# Patient Record
Sex: FEMALE | Race: WHITE | NOT HISPANIC OR LATINO | Employment: OTHER | URBAN - METROPOLITAN AREA
[De-identification: names, ages, dates, MRNs, and addresses within clinical notes are randomized per-mention and may not be internally consistent; named-entity substitution may affect disease eponyms.]

---

## 2017-01-19 ENCOUNTER — CONVERSION ENCOUNTER (OUTPATIENT)
Dept: OTHER | Facility: HOSPITAL | Age: 67
End: 2017-01-19

## 2017-02-06 ENCOUNTER — HOSPITAL ENCOUNTER (OUTPATIENT)
Dept: MRI IMAGING | Facility: HOSPITAL | Age: 67
Discharge: HOME | End: 2017-02-06
Attending: PSYCHIATRY & NEUROLOGY | Admitting: PSYCHIATRY & NEUROLOGY

## 2017-02-06 LAB — CREAT BLDA-MCNC: 1 MG/DL (ref 0.6–1.3)

## 2018-04-02 ENCOUNTER — CONVERSION ENCOUNTER (OUTPATIENT)
Dept: OTHER | Facility: HOSPITAL | Age: 68
End: 2018-04-02

## 2019-04-09 ENCOUNTER — CONVERSION ENCOUNTER (OUTPATIENT)
Dept: OTHER | Facility: HOSPITAL | Age: 69
End: 2019-04-09

## 2019-06-04 VITALS
DIASTOLIC BLOOD PRESSURE: 67 MMHG | DIASTOLIC BLOOD PRESSURE: 74 MMHG | HEIGHT: 65 IN | HEIGHT: 65 IN | SYSTOLIC BLOOD PRESSURE: 143 MMHG | DIASTOLIC BLOOD PRESSURE: 74 MMHG | BODY MASS INDEX: 19.99 KG/M2 | WEIGHT: 120 LBS | HEART RATE: 63 BPM | BODY MASS INDEX: 19.52 KG/M2 | OXYGEN SATURATION: 98 % | WEIGHT: 117.13 LBS | HEART RATE: 67 BPM | SYSTOLIC BLOOD PRESSURE: 131 MMHG | SYSTOLIC BLOOD PRESSURE: 131 MMHG | HEART RATE: 63 BPM | WEIGHT: 120 LBS

## 2019-11-18 ENCOUNTER — TELEPHONE (OUTPATIENT)
Dept: NEUROLOGY | Facility: CLINIC | Age: 69
End: 2019-11-18

## 2019-12-04 NOTE — TELEPHONE ENCOUNTER
She had MRI brain which was normal   So no reason to tell the insurance company that an mri is indicated, panic attacks do not qualify.    Probably needs to see behavioral health for management of anxiety and panic attacks. I can put in referral if she is interested

## 2019-12-06 ENCOUNTER — TELEPHONE (OUTPATIENT)
Dept: NEUROLOGY | Facility: CLINIC | Age: 69
End: 2019-12-06

## 2019-12-06 NOTE — TELEPHONE ENCOUNTER
FYI- patient wanted you to know the new medication is working and does not want to have MRI brain.

## 2020-03-03 DIAGNOSIS — R56.9 GENERALIZED CONVULSIVE SEIZURES (HCC): Primary | ICD-10-CM

## 2020-03-17 DIAGNOSIS — R56.9 GENERALIZED CONVULSIVE SEIZURES (HCC): ICD-10-CM

## 2020-04-20 ENCOUNTER — TELEPHONE (OUTPATIENT)
Dept: NEUROLOGY | Facility: CLINIC | Age: 70
End: 2020-04-20

## 2020-04-20 PROBLEM — I25.10 CORONARY ARTERY DISEASE: Status: ACTIVE | Noted: 2020-04-20

## 2020-04-20 PROBLEM — E78.5 HYPERLIPIDEMIA: Status: ACTIVE | Noted: 2020-04-20

## 2020-04-20 PROBLEM — I10 HYPERTENSION: Status: ACTIVE | Noted: 2020-04-20

## 2020-04-20 PROBLEM — Z82.0 FAMILY HISTORY OF EPILEPSY AND OTHER DISEASES OF THE NERVOUS SYSTEM: Status: ACTIVE | Noted: 2020-04-20

## 2020-04-20 PROBLEM — G40.909 SEIZURE DISORDER: Status: ACTIVE | Noted: 2020-04-20

## 2020-04-20 RX ORDER — ASPIRIN 81 MG/1
TABLET ORAL
COMMUNITY
Start: 2014-11-10 | End: 2023-06-13

## 2020-04-20 NOTE — TELEPHONE ENCOUNTER
Pt needs to make an appt   Either in office or via video tele-med   Last seen here April one year ago

## 2020-04-20 NOTE — TELEPHONE ENCOUNTER
PT CALLED  IN STATING SHE DOESN'T LIKE THE BRIVIACT  . SHE IS HAVING ALL THE SIDE EFFECTS OF THE MEDICATION SHE IS HAVING BAD STOMACH PAINS. PANIC ATTACKS THEY ARE GETTING WORSE. THE HORSINESS OF VOICE   AND WEAK AND SHAKY AND DIZZY AND FEELS TIRED ALL OF THE TIME  AND SHE DOESN'T FEEL TIRED NORMALLY SHE WOULD HAVE LOTS OF ENERGY .  SHE IS REQUESTING IF SHE COULD BE PUT ON SOMETHING ELSE  SHE REALLY DISLIKES THE BRIVICAT .         BEST CALL BACK NUMBER [place] PHONE .[phone]

## 2020-09-01 DIAGNOSIS — R56.9 GENERALIZED CONVULSIVE SEIZURES (HCC): ICD-10-CM

## 2020-09-01 RX ORDER — BRIVARACETAM 25 MG/1
TABLET, FILM COATED ORAL
Qty: 180 TABLET | Refills: 1 | OUTPATIENT
Start: 2020-09-01

## 2020-09-11 NOTE — TELEPHONE ENCOUNTER
called today to know why she is not able to get this medication. She is out of medication and is in need.

## 2020-09-16 DIAGNOSIS — R56.9 GENERALIZED CONVULSIVE SEIZURES (HCC): ICD-10-CM

## 2020-09-21 RX ORDER — BRIVARACETAM 25 MG/1
25 TABLET, FILM COATED ORAL 2 TIMES DAILY
Qty: 180 TABLET | Refills: 0 | Status: SHIPPED | OUTPATIENT
Start: 2020-09-21 | End: 2020-10-09 | Stop reason: SDUPTHER

## 2020-10-08 PROBLEM — F41.0 PANIC ATTACK AS REACTION TO STRESS: Status: ACTIVE | Noted: 2017-12-05

## 2020-10-08 PROBLEM — F32.A DEPRESSION: Status: ACTIVE | Noted: 2017-12-05

## 2020-10-08 PROBLEM — F43.0 PANIC ATTACK AS REACTION TO STRESS: Status: ACTIVE | Noted: 2017-12-05

## 2020-10-08 RX ORDER — MELOXICAM 15 MG/1
TABLET ORAL DAILY
COMMUNITY
Start: 2020-07-15 | End: 2020-12-18

## 2020-10-08 NOTE — PROGRESS NOTES
Tele health  TELEPHONE  TIME 11MIN 30 SEC  You have chosen to receive care through a telephone visit. Do you consent to use a telephone visit for your medical care today? Yes      Electronically signed by Joseph F. Seipel, MD, 10/09/20, 1:42 PM EDT.    Subjective: seizures    Patient ID: Harman Lui is a 69 y.o. female.      Yearly follow up seizure visit.     NO SEIZURES ON BRIVIACT 25MG BID  She feels this medication causes sleepiness    She is stressed and depressed  Her   of cancer this spring and also her oldest brother   She is ready to take some medication to help and will see her pcp this coming week.    She is off the keppra    The following portions of the patient's history were reviewed and updated as appropriate: allergies, current medications, past family history, past medical history, past social history, past surgical history and problem list.    No family history on file.    No past medical history on file.    Social History     Socioeconomic History   • Marital status:      Spouse name: Not on file   • Number of children: Not on file   • Years of education: Not on file   • Highest education level: Not on file         Current Outpatient Medications:   •  aspirin (Aspir-Low) 81 MG EC tablet, ASPIR-LOW 81 MG TBEC, Disp: , Rfl:   •  Brivaracetam (Briviact) 25 MG tablet, Take 25 mg by mouth 2 (Two) Times a Day., Disp: 180 tablet, Rfl: 0  •  lisinopril (PRINIVIL,ZESTRIL) 2.5 MG tablet, Take 2.5 mg by mouth Daily., Disp: , Rfl:   •  meloxicam (MOBIC) 15 MG tablet, Take  by mouth Daily., Disp: , Rfl:   •  simvastatin (ZOCOR) 20 MG tablet, Take 20 mg by mouth Daily., Disp: , Rfl:     Pt sounded stressed, and depressed but speech was normal      Assessment/Plan:    Diagnoses and all orders for this visit:    Generalized convulsive seizures (CMS/HCC)        Continue low dose briviact    Strongly encouraged to see pcp about the depression and stress    This document has been  electronically signed by Joseph Seipel, MD on October 9, 2020 13:45 EDT

## 2020-10-09 ENCOUNTER — OFFICE VISIT (OUTPATIENT)
Dept: NEUROLOGY | Facility: CLINIC | Age: 70
End: 2020-10-09

## 2020-10-09 DIAGNOSIS — R56.9 GENERALIZED CONVULSIVE SEIZURES (HCC): Primary | ICD-10-CM

## 2020-10-09 DIAGNOSIS — F32.9 REACTIVE DEPRESSION: ICD-10-CM

## 2020-10-09 PROBLEM — E78.5 HYPERLIPIDEMIA: Status: ACTIVE | Noted: 2020-10-09

## 2020-10-09 PROBLEM — I21.9 MYOCARDIAL INFARCTION: Status: ACTIVE | Noted: 2020-10-09

## 2020-10-09 PROBLEM — I10 BENIGN ESSENTIAL HYPERTENSION: Status: ACTIVE | Noted: 2020-10-09

## 2020-10-09 PROBLEM — G57.90: Status: ACTIVE | Noted: 2020-10-09

## 2020-10-09 PROCEDURE — 99442 PR PHYS/QHP TELEPHONE EVALUATION 11-20 MIN: CPT | Performed by: PSYCHIATRY & NEUROLOGY

## 2020-10-09 RX ORDER — SIMVASTATIN 20 MG
20 TABLET ORAL DAILY
COMMUNITY
End: 2020-12-18

## 2020-10-09 RX ORDER — BRIVARACETAM 25 MG/1
25 TABLET, FILM COATED ORAL 2 TIMES DAILY
Qty: 180 TABLET | Refills: 3 | Status: SHIPPED | OUTPATIENT
Start: 2020-10-09 | End: 2021-01-19

## 2020-10-09 RX ORDER — LEVETIRACETAM 750 MG/1
750 TABLET ORAL DAILY
COMMUNITY
End: 2020-10-09 | Stop reason: ALTCHOICE

## 2020-10-09 RX ORDER — LISINOPRIL 2.5 MG/1
2.5 TABLET ORAL DAILY
COMMUNITY
End: 2020-12-18

## 2020-12-18 ENCOUNTER — TELEPHONE (OUTPATIENT)
Dept: NEUROLOGY | Facility: CLINIC | Age: 70
End: 2020-12-18

## 2020-12-18 DIAGNOSIS — R56.9 GENERALIZED CONVULSIVE SEIZURES (HCC): Primary | ICD-10-CM

## 2020-12-18 DIAGNOSIS — R56.9 GENERALIZED CONVULSIVE SEIZURES (HCC): ICD-10-CM

## 2020-12-18 NOTE — TELEPHONE ENCOUNTER
I suggest do an EEG and if that looks ok then consider  stopping the medication, the current dose is very small in any case.    I don't know any thing about taking cbd? Oil for seizures    There is a brand name seizure med for kids with some chemical found in cbd oil

## 2020-12-18 NOTE — TELEPHONE ENCOUNTER
----- Message from Whit Maxwell CMA sent at 12/15/2020  3:26 PM EST -----  Regarding: Prescription Question  Contact: [phone]  Please advise    ----- Message -----  From: Harman Lui  Sent: 12/15/2020  11:34 AM EST  To: [facility]  Subject: Prescription Question                            I need to see if Dr Rosario will give me another medicine  Briviact has too many side affects.I kept detailed desciption  after I took it. I wrote everything down. I have every side affect and it is not getting better. I tried but I am really worried about what it does to my heart. I don't want a controlled substance and besides prescription meds what else is there for people that have medicine side affects. What is that oil they give to kids to stop seizures. I told Dr Rosario I would keep using Briviact but I can not keep it up I feel too bad.I told him I would keep trying for a while but I am done. I just got  3 months supply.I want a low dose. I have not had a seizure in 7 years or longer.I want to try the oil. Harman Lui Thanks

## 2021-01-18 ENCOUNTER — TELEPHONE (OUTPATIENT)
Dept: NEUROLOGY | Facility: CLINIC | Age: 71
End: 2021-01-18

## 2021-01-18 RX ORDER — CITALOPRAM 10 MG/1
10 TABLET ORAL DAILY
COMMUNITY
Start: 2020-10-16 | End: 2022-04-18

## 2021-01-18 NOTE — TELEPHONE ENCOUNTER
----- Message from Amira Yuan sent at 1/18/2021  8:27 AM EST -----  Regarding: FW: Prescription Question  Contact: [phone]  Refills needed for 30 days to [facility], generic meds only due to cost please.    ----- Message -----  From: Harman Lui  Sent: 1/17/2021  10:05 AM EST  To: [facility]  Subject: Prescription Question                            Dr. Seipel, I have [employer] now and they do not have 90 day refills only 30 day. And they charge 110.00 for 30 days which is redicoulous. So I need a generic seizure medication so it will be cheaper. [facility] was 120.00 for 90 days. I will not be going off seizure meds. I am scared to. I told them to cancel the eeg back in December.  Whatever is generic and cheaper. Thank you. Harman Lui

## 2021-01-18 NOTE — TELEPHONE ENCOUNTER
Please call javier and ask what seizure meds she has taken over the years  The briviact is brand name only so we would have to change to a different med if needs something cheaper, could go back on one of the prior meds or could try something different

## 2021-01-19 RX ORDER — LEVETIRACETAM 250 MG/1
250 TABLET ORAL 2 TIMES DAILY
Qty: 180 TABLET | Refills: 3 | Status: SHIPPED | OUTPATIENT
Start: 2021-01-19 | End: 2021-03-29 | Stop reason: SDUPTHER

## 2021-02-16 ENCOUNTER — TELEPHONE (OUTPATIENT)
Dept: NEUROLOGY | Facility: CLINIC | Age: 71
End: 2021-02-16

## 2021-02-16 RX ORDER — DIVALPROEX SODIUM 250 MG/1
250 TABLET, EXTENDED RELEASE ORAL DAILY
Qty: 30 TABLET | Refills: 3 | Status: SHIPPED | OUTPATIENT
Start: 2021-02-16 | End: 2022-04-05

## 2021-02-16 NOTE — TELEPHONE ENCOUNTER
----- Message from Amira Yuan sent at 2/15/2021  7:44 AM EST -----  Regarding: FW: Prescription Question  Contact: [phone]    ----- Message -----  From: Harman Lui  Sent: 2/14/2021   1:58 PM EST  To: [facility]  Subject: Prescription Question                            Dr Rosario, I am having bad side affects on  levetiracetam. Dizzy. hoarse  depression, nervous,restless,grouchy, panic attacks. I need another  medicine.Or whatever you think. Also no appetite stomach problems starting. If you change medicines please use low dose.

## 2021-02-16 NOTE — TELEPHONE ENCOUNTER
Harman has tried  Different medications over  the years    Ask if she has tried Depakote/valporic acid or if she has ever tried zonegran / zonisimide?

## 2021-03-01 PROBLEM — I10 HYPERTENSION: Status: RESOLVED | Noted: 2020-04-20 | Resolved: 2021-03-01

## 2021-03-01 PROBLEM — E78.5 HYPERLIPIDEMIA: Status: RESOLVED | Noted: 2020-04-20 | Resolved: 2021-03-01

## 2021-03-29 RX ORDER — LEVETIRACETAM 250 MG/1
250 TABLET ORAL 2 TIMES DAILY
Qty: 180 TABLET | Refills: 3 | Status: SHIPPED | OUTPATIENT
Start: 2021-03-29 | End: 2022-04-24 | Stop reason: ALTCHOICE

## 2022-03-14 ENCOUNTER — TELEPHONE (OUTPATIENT)
Dept: NEUROLOGY | Facility: CLINIC | Age: 72
End: 2022-03-14

## 2022-03-14 DIAGNOSIS — R56.9 GENERALIZED CONVULSIVE SEIZURES: Primary | ICD-10-CM

## 2022-03-14 NOTE — TELEPHONE ENCOUNTER
I will order an eeg   Will then see her at the prior scheduled April visit to discuss results and what to do about the medicaion

## 2022-03-14 NOTE — TELEPHONE ENCOUNTER
Caller: ENDY    Relationship: SELF    Best call back number: [phone]    What medications are you currently taking:   Current Outpatient Medications on File Prior to Visit   Medication Sig Dispense Refill   • aspirin (Aspir-Low) 81 MG EC tablet ASPIR-LOW 81 MG TBEC     • citalopram (CeleXA) 10 MG tablet Take 10 mg by mouth Daily.     • divalproex (Depakote ER) 250 MG 24 hr tablet Take 1 tablet by mouth Daily. 30 tablet 3   • levETIRAcetam (KEPPRA) 250 MG tablet Take 1 tablet by mouth 2 (Two) Times a Day. 180 tablet 3     No current facility-administered medications on file prior to visit.        Which medication are you concerned about: levETIRAcetam (KEPPRA) 250 MG tablet    Who prescribed you this medication: DR.SEIPEL    What are your concerns: SHE STATES THAT THIS MEDICATION IS CAUSING :    - ANEIXTY  - DEPRESSION  - RESTLESSNESS  - PANIC ATTACKS  - MOOD SWINGS  - CANT SLEEP  - MOSTLY ANGER     SHE SIMPLY STATES SHE IS CURRENTLY NOT HERSELF.     How long have you had these concerns: A YEAR NOW     SHE DID ADVISE ME THAT SHE IS CURRENTLY ON TAKING THIS MEDICATION ONCE A DAY NOW.     SHE ALSO STATES SHE WILL LIKE TO HAVE A EEG, AND DETERMINE IF SHE CAN JUST STOP THIS MEDICATION ALL TOGETHER

## 2022-04-05 ENCOUNTER — APPOINTMENT (OUTPATIENT)
Dept: NEUROLOGY | Facility: HOSPITAL | Age: 72
End: 2022-04-05

## 2022-04-05 ENCOUNTER — HOSPITAL ENCOUNTER (OUTPATIENT)
Dept: NEUROLOGY | Facility: HOSPITAL | Age: 72
Discharge: HOME | End: 2022-04-05
Admitting: PSYCHIATRY & NEUROLOGY

## 2022-04-05 DIAGNOSIS — R56.9 GENERALIZED CONVULSIVE SEIZURES: ICD-10-CM

## 2022-04-05 PROCEDURE — 95816 EEG AWAKE AND DROWSY: CPT | Performed by: PSYCHIATRY & NEUROLOGY

## 2022-04-05 PROCEDURE — 95816 EEG AWAKE AND DROWSY: CPT

## 2022-04-05 RX ORDER — MELOXICAM 15 MG/1
15 TABLET ORAL DAILY
COMMUNITY
Start: 2022-03-07

## 2022-04-05 RX ORDER — BUSPIRONE HYDROCHLORIDE 10 MG/1
10 TABLET ORAL 2 TIMES DAILY
COMMUNITY
Start: 2022-02-14 | End: 2022-04-18

## 2022-04-06 ENCOUNTER — TELEPHONE (OUTPATIENT)
Dept: NEUROLOGY | Facility: CLINIC | Age: 72
End: 2022-04-06

## 2022-04-06 NOTE — TELEPHONE ENCOUNTER
EEG 4/5/22    Impression:       This is a normal EEG recorded during the awake state. The absence of   epileptiform abnormalities however does not rule out the presence of a   seizure disorder.     Electronically signed by:     Joseph Seipel, MD   April 5, 2022
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
## 2022-04-07 ENCOUNTER — TELEPHONE (OUTPATIENT)
Dept: NEUROLOGY | Facility: CLINIC | Age: 72
End: 2022-04-07

## 2022-04-07 NOTE — TELEPHONE ENCOUNTER
ANOTHER TELEPHONE ENCOUNTER OPEN ON THIS PATIENT WITH SAME INFORMATION. WILL SIGN OFF AND CLOSE OUT THIS ONE.
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
## 2022-04-14 NOTE — TELEPHONE ENCOUNTER
I spoke with patient back when she called at this time she did not go to ER  tried giving her a cb to get more info but I had to lmtrc

## 2022-04-18 ENCOUNTER — OFFICE VISIT (OUTPATIENT)
Dept: NEUROLOGY | Facility: CLINIC | Age: 72
End: 2022-04-18

## 2022-04-18 ENCOUNTER — TELEPHONE (OUTPATIENT)
Dept: NEUROLOGY | Facility: CLINIC | Age: 72
End: 2022-04-18

## 2022-04-18 VITALS
TEMPERATURE: 97.7 F | BODY MASS INDEX: 18.49 KG/M2 | HEIGHT: 65 IN | SYSTOLIC BLOOD PRESSURE: 110 MMHG | WEIGHT: 111 LBS | HEART RATE: 69 BPM | DIASTOLIC BLOOD PRESSURE: 78 MMHG

## 2022-04-18 DIAGNOSIS — G40.909 SEIZURE DISORDER: Primary | ICD-10-CM

## 2022-04-18 PROCEDURE — 99214 OFFICE O/P EST MOD 30 MIN: CPT | Performed by: PSYCHIATRY & NEUROLOGY

## 2022-04-18 NOTE — TELEPHONE ENCOUNTER
Caller: ENDY     Best call back number: [phone]      What was the call regarding: PT HAS DECIDED TO GO BACK ON SEIZURE RX . AND SHE WILL TAKE WHAT DR SEIPEL WANTS PRESCRIBES . SHE DON'T LIKE KEJELENARA AND HE KNOWS THAT?      Do you require a callback: YES TO LET HER KNOW WHAT HE DECIDES TO CALL IN .     [id number] - [address] SEC OF [facility] [address] [facility] [address] - [phone]  - [phone]   [phone]    PLEASE ADVISE

## 2022-04-24 RX ORDER — ESLICARBAZEPINE ACETATE 400 MG/1
TABLET ORAL
Qty: 60 TABLET | Refills: 11 | Status: SHIPPED | OUTPATIENT
Start: 2022-04-24 | End: 2022-05-13 | Stop reason: SINTOL

## 2022-04-24 NOTE — TELEPHONE ENCOUNTER
Recommend aptiom start at 1/2 dose for two weeks or 400mg then increase to two at hs.     maintenance dose is 800 mg per day

## 2022-04-25 NOTE — TELEPHONE ENCOUNTER
Provider: SEIPEL  Caller: PATIENT  Relationship to Patient: SELF  Pharmacy: [facility] #[id number]  Phone Number: [phone]  Reason for Call: PATIENT TELEPHONED RE: HER EARLIER MESSAGE FOR SEIZURE MEDICATION, BUT NOT KEPPRA.    PATIENT HAS NOT HAD ANY RECENT SEIZURES, BUT DOES NOT FEEL RIGHT.    PLEASE CALL PATIENT TO ADVISE STATUS.    THANK YOU.

## 2022-04-26 ENCOUNTER — TELEPHONE (OUTPATIENT)
Dept: NEUROLOGY | Facility: CLINIC | Age: 72
End: 2022-04-26

## 2022-04-26 NOTE — TELEPHONE ENCOUNTER
Provider: SEIPEL  Caller: [facility]  Relationship to Patient: N/A  Pharmacy: [facility]  Phone Number: [phone]  Reason for Call: [facility] TELEPHONED WITH ADDITIONAL QUESTIONS FOR THE ESLICARBAZEPINE ACETATE (APTIOM) 400 MG TABLET.    HAS MANY QUESTIONS.    PLEASE CALL & ADVISE.    THANK YOU.

## 2022-05-02 ENCOUNTER — TELEPHONE (OUTPATIENT)
Dept: NEUROLOGY | Facility: CLINIC | Age: 72
End: 2022-05-02

## 2022-05-02 NOTE — TELEPHONE ENCOUNTER
Provider: DR. SEIPEL  Caller: ENDY  Relationship to Patient: SELF  Pharmacy:N/A  Phone Number: [phone]  Reason for Call: PATIENT STATES THE PHARMACY HAS NOT RECEIVED THE RX FOR APTIOM    When was the patient last seen: 04/18/2022

## 2022-05-12 ENCOUNTER — TELEPHONE (OUTPATIENT)
Dept: NEUROLOGY | Facility: CLINIC | Age: 72
End: 2022-05-12

## 2022-05-12 NOTE — TELEPHONE ENCOUNTER
Caller: ENDY     Relationship: PT    Best call back number: [phone]    Requested Prescriptions:   levETIRAcetam (KEPPRA) 250 MG tablet     Pharmacy where request should be sent:    [id number] - [place], [place] - [address] - [phone] PH - [phone] FX   [address] [place] [address]   Phone: [phone] Fax: [phone]   Hours: Not open 24 hours         Additional details provided by patient: PT STARTED THE APTIOM ABOUT 4 DAYS AGO BUT PT STATES THAT SHE CAN'T SLEEP ON THIS MEDICATION. PT STATES THAT SHE WOULD LIKE TO SPEAK TO DR. SEIPEL ABOUT THIS AT HER NEXT APPT.     Does the patient have less than a 3 day supply:  [x] Yes  [] No    Jyoti Arguelles Rep   05/12/22 15:14 EDT

## 2022-05-13 RX ORDER — LEVETIRACETAM 250 MG/1
250 TABLET ORAL 2 TIMES DAILY
Qty: 180 TABLET | Refills: 1 | Status: SHIPPED | OUTPATIENT
Start: 2022-05-13 | End: 2022-06-02 | Stop reason: SDUPTHER

## 2022-05-13 NOTE — TELEPHONE ENCOUNTER
i will reorder the Keppra, however in the past this patient no matter what medication is prescribed always complains of side effects and never takes the prescribed effective dose

## 2022-06-02 ENCOUNTER — TELEPHONE (OUTPATIENT)
Dept: NEUROLOGY | Facility: CLINIC | Age: 72
End: 2022-06-02

## 2022-06-02 RX ORDER — LEVETIRACETAM 250 MG/1
250 TABLET ORAL 2 TIMES DAILY
Qty: 180 TABLET | Refills: 1 | Status: SHIPPED | OUTPATIENT
Start: 2022-06-02 | End: 2022-06-29 | Stop reason: SINTOL

## 2022-06-02 NOTE — TELEPHONE ENCOUNTER
Caller: Harman Lui    Relationship: Self    Best call back number: [phone]  What medications are you currently taking:   Current Outpatient Medications on File Prior to Visit   Medication Sig Dispense Refill   • aspirin (aspirin) 81 MG EC tablet ASPIR-LOW 81 MG TBEC     • levETIRAcetam (KEPPRA) 250 MG tablet Take 1 tablet by mouth 2 (Two) Times a Day. 180 tablet 1   • meloxicam (MOBIC) 15 MG tablet Take 15 mg by mouth Daily.     • [DISCONTINUED] levETIRAcetam (KEPPRA) 250 MG tablet Take 1 tablet by mouth 2 (Two) Times a Day. 180 tablet 1     No current facility-administered medications on file prior to visit.          When did you start taking these medications: N/A    Which medication are you concerned about: LEVETIRACETAM    Who prescribed you this medication: DR. SEIPEL    What are your concerns: PATIENT STATES THE MEDICATION MAKES HER FEEL REALLY TIRED AND WEAK    PLEASE CALL AND ADVISE PATIENT    THANK YOU    How long have you had these concerns: SINCE SHE STARTED MEDICATION

## 2022-06-02 NOTE — TELEPHONE ENCOUNTER
Caller: Harman Lui    Relationship: Self    Best call back number: [phone]    What was the call regarding: PT CALLED TO REQUEST REFILL OF LEVETIRACETAM (KEPPRA) MEDICATION. I ADVISED PT THAT DR. SEIPEL HAD SENT REFILLS OF MEDICATION TO HER PHARMACY ON 5/13/22 & THIS WOULD HAVE BEEN FOR A 90-DAY SUPPLY W/ ONE REFILL. I ADVISED PT THAT SHE SHOULD CONTACT HER PHARMACY TO REQUEST REFILL IF NEEDED. PT VERBALIZED UNDERSTAND AND WILL CALL BACK WITH ANY ISSUES.    Do you require a callback: NO    DOCUMENTING PER [employer].

## 2022-06-02 NOTE — TELEPHONE ENCOUNTER
Caller: Harman Lui    Relationship: Self    Best call back number: [phone] (H)    Requested Prescriptions:   Requested Prescriptions     Pending Prescriptions Disp Refills   • levETIRAcetam (KEPPRA) 250 MG tablet 180 tablet 1     Sig: Take 1 tablet by mouth 2 (Two) Times a Day.        Pharmacy where request should be sent: [facility] #[id number] - [address] [place] [phone] [place] [phone] FX     Additional details provided by patient: PT HAS A WEEK OF MEDICATION LEFT.      Does the patient have less than a 3 day supply:  [] Yes  [x] No    Jyoti Fuentes Rep   06/02/22 09:22 EDT   st.”
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
## 2022-06-06 NOTE — TELEPHONE ENCOUNTER
Patient states she has not had a seizure in 11-12 yrs. She states she only takes 1/2 mg of the 250 mg tab keppra at night and seems to be still be doing well

## 2022-06-27 ENCOUNTER — TELEPHONE (OUTPATIENT)
Dept: NEUROLOGY | Facility: CLINIC | Age: 72
End: 2022-06-27

## 2022-06-27 DIAGNOSIS — G40.909 SEIZURE DISORDER: Primary | ICD-10-CM

## 2022-06-27 NOTE — TELEPHONE ENCOUNTER
Caller: Hu Harman KYLE    Relationship: Self    Best call back number: [phone]    What medications are you currently taking:   Current Outpatient Medications on File Prior to Visit   Medication Sig Dispense Refill   • aspirin (aspirin) 81 MG EC tablet ASPIR-LOW 81 MG TBEC     • levETIRAcetam (KEPPRA) 250 MG tablet Take 1 tablet by mouth 2 (Two) Times a Day. 180 tablet 1   • meloxicam (MOBIC) 15 MG tablet Take 15 mg by mouth Daily.       No current facility-administered medications on file prior to visit.      When did you start taking these medications:  N/A    Which medication are you concerned about: KEPPRA    Who prescribed you this medication: DR. SEIPEL    What are your concerns: PATIENT CALLED WITH CONCERN ABOUT SIDE EFFECTS OF KEPPRA - SHE IS EXPERIENCING WEAKNESS, NUMBNESS FEET&LEGS, CAN'T FEEL BOTTOM OF FEET, DIZZINESS, HEAD FOG - THINKING SLOWLY. THIS HAS BEEN FOR THE LAST YEAR - SHE IS TAKING 1/2 TABLET IN AM AND 1/2 TABLET IN PM.  PLEASE ADVISE.      How long have you had these concerns: N/A

## 2022-06-27 NOTE — TELEPHONE ENCOUNTER
These symptoms are probably not due to the keppra    Taking only 1/2 keppra 250mg bid is not enough to do any good    So stop the keppra and see if any of these symptoms change    Do not drive

## 2022-06-28 NOTE — TELEPHONE ENCOUNTER
Patient states she is afraid to stop taking medication and does not want to just stop bc she is afraid  of having a seizure and she lives alone, she feels that she prob not taking enough so she states she will increase her keppra 500 mg tonight and in morning

## 2022-06-29 RX ORDER — LAMOTRIGINE 25 MG/1
TABLET ORAL
Qty: 60 TABLET | Refills: 3 | Status: SHIPPED | OUTPATIENT
Start: 2022-06-29 | End: 2022-07-01 | Stop reason: SDUPTHER

## 2022-06-29 NOTE — TELEPHONE ENCOUNTER
Stop the Keppra  Start Lamictal 25 mg daily for two weeks    Then 25 mg bid for two weeks     Then one in am and two in pm for two weeks   Then two po bid.      After taking the two po bid  For several weeks call for new script for larger size pill and further instructions    If she develops a rash stop taking and call.   As with any anticonvulsant someone maybe allergic, but the rash can be severe with lamictal but fortunately rarely occurss.

## 2022-06-29 NOTE — TELEPHONE ENCOUNTER
Has she taken lamictal in the past   If not we can try   Usually well tolerated but has to increase the dose very slowly starting 25mg daily , increasing up to 100mg bid

## 2022-06-29 NOTE — TELEPHONE ENCOUNTER
PT STATES SHE HAS NEVER TRIED LAMICTAL AND IS WILLING TO TRY PLEASE SEND TO LOCAL PHARMACY....  ALSO SHOULD PATIENT JUST STOP KEPPRA

## 2022-06-29 NOTE — TELEPHONE ENCOUNTER
Caller: Harman Lui    Relationship: Self    Best call back number: [phone]    What medications are you currently taking:   Current Outpatient Medications on File Prior to Visit   Medication Sig Dispense Refill   • aspirin (aspirin) 81 MG EC tablet ASPIR-LOW 81 MG TBEC     • levETIRAcetam (KEPPRA) 250 MG tablet Take 1 tablet by mouth 2 (Two) Times a Day. 180 tablet 1   • meloxicam (MOBIC) 15 MG tablet Take 15 mg by mouth Daily.       No current facility-administered medications on file prior to visit.        Which medication are you concerned about: KEPPRA    Who prescribed you this medication: DR.SEIPEL    What are your concerns: SHE STATES WHEN SHE TOOK THE WHOLE 250MG LAST NIGHT AND ONE THIS MORNING EQUALING 500MG. THAT SHE STARTED HAVING THE FOLLOWING SYMPTOMS:    -HEART IS RACING  -SKIPPING BEATS    PT STATES SHE WANTS TO CHANGE THE SEIZURE MEDICATION PLEASE.

## 2022-07-01 DIAGNOSIS — G40.909 SEIZURE DISORDER: ICD-10-CM

## 2022-07-01 RX ORDER — LAMOTRIGINE 25 MG/1
TABLET ORAL
Qty: 60 TABLET | Refills: 3 | Status: SHIPPED | OUTPATIENT
Start: 2022-07-01 | End: 2022-08-08 | Stop reason: SDUPTHER

## 2022-08-08 ENCOUNTER — TELEPHONE (OUTPATIENT)
Dept: NEUROLOGY | Facility: CLINIC | Age: 72
End: 2022-08-08

## 2022-08-08 DIAGNOSIS — G40.909 SEIZURE DISORDER: ICD-10-CM

## 2022-08-08 RX ORDER — TRAZODONE HYDROCHLORIDE 50 MG/1
50 TABLET ORAL
COMMUNITY
Start: 2022-06-22

## 2022-08-08 RX ORDER — BUSPIRONE HYDROCHLORIDE 10 MG/1
10 TABLET ORAL 2 TIMES DAILY
COMMUNITY
Start: 2022-07-08

## 2022-08-08 RX ORDER — LAMOTRIGINE 25 MG/1
TABLET ORAL
Qty: 60 TABLET | Refills: 3 | Status: SHIPPED | OUTPATIENT
Start: 2022-08-08 | End: 2022-08-26 | Stop reason: SINTOL

## 2022-08-08 RX ORDER — CITALOPRAM 20 MG/1
20 TABLET ORAL DAILY
COMMUNITY
Start: 2022-06-06

## 2022-08-08 NOTE — TELEPHONE ENCOUNTER
S/W PATIENT SHE STATES SHE NEVER RECEIVED LAMICTAL RX, SHE STATES SHE WOULD LIKE TO TRY MEDICATION... RX SENT TO [facility] PER PATIENT REQUEST..

## 2022-08-10 NOTE — TELEPHONE ENCOUNTER
PT STATES SHE IS HAVING EFFECTS FROM RX . HAVING TROUBLE SLEEPING, CAN'T EAT, PROBLEM WITH LEFT EYE TWITCHING, SHAKY, TIRED AND WEEK, BALANCE OFF. FELT LIKE HAD FLU WHEN WOKE FROM RX.  PT SAID MAYBE SHE CAN TAKE THE LIQUID  RX THEY GIVE KIDS ? IT HAS MARIJUANA IN IT  SHE THINK'S . JUST WILLING TO TRY ANYTHING.     ALSO PT WANTED TO KNOW IF YOU WANTED HER TO GO BACK TO [facility] OR WHAT ?     PT WOULD LIKE A CALLBACK.       PLEASE ADVISE.

## 2022-08-11 ENCOUNTER — TELEPHONE (OUTPATIENT)
Dept: NEUROLOGY | Facility: CLINIC | Age: 72
End: 2022-08-11

## 2022-08-11 NOTE — TELEPHONE ENCOUNTER
PATIENT CALLED IN TO ADVISE SHE WILL TAKE THE LAMICTAL (25MG) TABLET AND SEE HOW IT WORKS FOR HER.

## 2022-08-26 NOTE — TELEPHONE ENCOUNTER
PT CALLED TO LET YOU KNOW HOW SHE IS DOING ON RX. SHE DON'T THINK SHE CAN TAKE RX SHE HAS BRUISING , TROUBLE SWALLOING , HORSE , EYE SITE NOT SAME, DIZZY NOT ALWAYS , NOSE BLEEDS CONFUSED , NOT PASSING URINE LIKE SHE SHOULD,APITITE IS BAD .     SHE SAID THIS STARTED GETTING BAD WITH ALL SYMPTOMS IN PAST 3 DAYS     CALLED OFFICE TO LET THEM KNOW SIDE EFFECTS , SAID DR SEIPEL WILL BE BACK AFTER EMG AND CAN LOOK AT AND CALL PT.     PLEASE CALL AND  ADVISE [phone]    [id number] - [place] [address] [email or web address] [facility] [address] [facility] [address] - [phone] PH - [phone]   [phone]

## 2022-08-26 NOTE — TELEPHONE ENCOUNTER
Stop the medication and see if her symptoms go away.  I presume the medication she is talking about is the Lamictal prescribed for her seizures

## 2022-08-29 NOTE — TELEPHONE ENCOUNTER
PT STATES THAT SHE HAS FIGURED OUT ITS THE ANXIETY MEDICATION, NOT THE SEIZURE MEDICATION. PT IS GOING TO CONTINUE TAKING THE SEIZURE MEDICATION AND CALL HER  ABOUT THE ANXIETY MEDICATION.

## 2022-09-02 ENCOUNTER — TELEPHONE (OUTPATIENT)
Dept: NEUROLOGY | Facility: CLINIC | Age: 72
End: 2022-09-02

## 2022-09-02 NOTE — TELEPHONE ENCOUNTER
PT STATES THAT SHE SEEN HER PCP DR. BANGURA TODAY AND SHE BELIEVES THAT THESE ARE SYMPTOMS OF ANXIETY. DR. BANGURA INCREASED HER ANXIETY MEDICATION. SHE STATES THAT DR. BANGURA MENTIONED THAT HER JOINT PAIN MEDICATION COULD BE THE CAUSE OF HER BRUISING.

## 2022-09-02 NOTE — TELEPHONE ENCOUNTER
Provider: SEIPEL   Caller: PATIENT  Relationship to Patient: SELF  Pharmacy: N/A  Phone Number: [phone]  Reason for Call: PATIENT TELEPHONED RE:SIDE EFFECTS FROM SEIZURE MED LAMOTRIGINE 25 MG TABLET- TWICE DAILY. SHE STATES SHE IS HAVING TROUBLE SLEEPING, DRY MOUTH, TROUBLE SWALLOWING, HOARSED THROAT, ITCHING, DIZZY A LOT, BALANCE IS OFF, LIGHT HURTS EYES, LITTLE ROUND CIRCLES/BRUISING ON HER ARM, CONFUSION, SHORTNESS OF BREATH, & YELLOWING EYES.    PLEASE CALL & ADVISE     THANK YOU

## 2022-09-02 NOTE — TELEPHONE ENCOUNTER
You will need to discontinue taking the Lamictal due to side effects.    However she does remain at risk for seizures therefore she has to refrain from driving or any other activity which would put her or someone else in danger if she were to have a seizure

## 2022-09-19 NOTE — TELEPHONE ENCOUNTER
Caller: Hu Nayanmarleny WRIGHT    Relationship: Self    Best call back number: [phone]    What was the call regarding: PT CALLED TO REPORT SIDE EFFECTS OF LAMICTAL MEDICATION. I ADVISED PT THAT SHE HAD ALREADY CALLED EARLIER THIS MONTH (9/2/22). PER GAUTAM LOVING TO RELAY DR. SEIPEL ENCOUNTER. I ADVISED PT THAT DR. SEIPEL ADVISED SHE DISCONTINUE TAKING THE LAMICTAL MEDICATION, HOWEVER, SHE IS AT RISK FOR SEIZURES SO SHE WOULD NEED TO REFAIN FROM DRIVING WHICH WOULD PUT HERSELF & OTHERS AT RISK.    PT THEN STATED THAT SHE WOULD NOT DISCONTINUE TAKING THE MEDICATION AS SHE NEEDS TO BE ABLE TO DRIVE. PT STATES SHE LIVES ALONE AND CANNOT RISK HAVING A SEIZURE WITH NO ONE AROUND. PT AGAIN STATES SHE WILL CONTINUE TAKING THE LAMICTAL MEDICATION. I ADVISED FABIENNE AND SHE ADVISED I DOCUMENT IN PT'S CHART.    PT STATES MANY OF THE SIDE EFFECTS SHE REPORTED PREVIOUSLY HAVE RESOLVED INCLUDING THE DIZZINESS AND DRY MOUTH. PT STATES HER SLEEP ISSUES HAVE BEEN ONGOING SINCE HER  PASSED AWAY IN 2020. PT STATES THE SIDE EFFECTS ARE MILD AND SHE WOULD LIKE TO CONTINUE TAKING. PT ASKED IF DR. SEIPEL WOULD REFILL THE MEDICATION WHEN SHE IS IN NEED TO THE REFILL.    Do you require a callback: YES, PLEASE ADVISE IF DR. SEIPEL WILL CONTINUE TO FILL.    PLEASE REVIEW AND ADVISE.

## 2022-09-23 NOTE — TELEPHONE ENCOUNTER
PT SAID SHE   IS GOING TO START  TAKING RX S FROM HER PCP SHE GAVE HER FOR DEPRESSION  AS SHE REALIZED THAT IS WHAT IS WRONG? SHE WILL LET US KNOW IF THIS DON'T WORK     PLEASE ADVISE.

## 2022-10-05 RX ORDER — LEVETIRACETAM 250 MG/1
TABLET ORAL
Qty: 180 TABLET | OUTPATIENT
Start: 2022-10-05

## 2022-12-16 ENCOUNTER — TELEPHONE (OUTPATIENT)
Dept: NEUROLOGY | Facility: CLINIC | Age: 72
End: 2022-12-16

## 2022-12-16 NOTE — TELEPHONE ENCOUNTER
Caller: Hu Harman KYLE    Relationship: Self    Best call back number: [phone]    What medications are you currently taking:   Current Outpatient Medications on File Prior to Visit   Medication Sig Dispense Refill   • aspirin (aspirin) 81 MG EC tablet ASPIR-LOW 81 MG TBEC     • busPIRone (BUSPAR) 10 MG tablet Take 10 mg by mouth 2 (Two) Times a Day.     • citalopram (CeleXA) 20 MG tablet Take 20 mg by mouth Daily.     • meloxicam (MOBIC) 15 MG tablet Take 15 mg by mouth Daily.     • traZODone (DESYREL) 50 MG tablet Take 50 mg by mouth every night at bedtime.       No current facility-administered medications on file prior to visit.          When did you start taking these medications: N/A    Which medication are you concerned about: LAMOTRIGINE    Who prescribed you this medication: DR. SEIPEL    What are your concerns: PATIENT STATES THAT THE MEDICATION CAN CAUSE SKIN CANCER. PATIENT IS BEING TREATED FOR SKIN CANCER.    SHE IS WANTING TO KNOW SHOULD SHE CONTINUE TAKING THE MEDICATION    PLEASE CALL AND ADVISE      How long have you had these concerns: N/A

## 2022-12-16 NOTE — TELEPHONE ENCOUNTER
PATIENT HAD CALLED IN TO ADVISE SHE ONLY RECEIVED A SMALL AMOUNT OF TABLETS (LAMOTRIGINE 24 MG TABLET) WHEN SHE PICKED UP AT PHARMACY.    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    I ADVISED HER THAT SHE WOULD NEED AN OFFICE APPOINTMENT FOR DR SEIPEL TO REVIEW HER MEDICATIONS AND UPDATE HER PRESCRIPTIONS - SHE ACKNOWLEDGED UNDERSTANDING AND SAID SHE WOULD CALL IN TO SCHEDULE.

## 2022-12-19 RX ORDER — LAMOTRIGINE 25 MG/1
TABLET ORAL
COMMUNITY
Start: 2022-12-12 | End: 2023-02-08

## 2022-12-19 NOTE — TELEPHONE ENCOUNTER
Lamotrigine can cause a serious skin rash, I have never heard or read that it can cause cancer.  So keep taking the lamotrigine. Do ask the dermatologist  / the doctor taking care of your skin cancer and see what they say about the lamotrigine I suspect they will agree it is ok to keep taking.

## 2023-02-02 ENCOUNTER — TELEPHONE (OUTPATIENT)
Dept: NEUROLOGY | Facility: CLINIC | Age: 73
End: 2023-02-02
Payer: MEDICARE

## 2023-02-02 DIAGNOSIS — G40.909 SEIZURE DISORDER: Primary | ICD-10-CM
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
## 2023-02-06 NOTE — TELEPHONE ENCOUNTER
Please call and see what javier is actually taking as she frequently changes  on her own.   If taking 25mg tab bid as she should be based on the titration schedule then I will change to a 50mg tab

## 2023-02-08 RX ORDER — LAMOTRIGINE 100 MG/1
50 TABLET ORAL 2 TIMES DAILY
Qty: 90 TABLET | Refills: 1 | Status: SHIPPED | OUTPATIENT
Start: 2023-02-08 | End: 2023-02-27 | Stop reason: SDUPTHER
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
## 2023-02-17 NOTE — TELEPHONE ENCOUNTER
PATIENT CALLED AND READ OFF MEDICATIONS SHE IS TAKING TO ME.  I CONFIRMED THEM AGAINST HER CURRENT MEDICATION LIST.  SHE SAYS HER PCP PRESCRIBES SOME OF THEM AND THAT SHE HAS BEEN TAKING ALL MEDICATIONS.    SHE SAYS SHE TAKES  LAMICTAL  BUSPAR  MOBIC    NO OTHERS ON LIST    SHE WILL GO FOR ORDERED LAB WORK FIRST OF THE WEEK.

## 2023-02-20 NOTE — TELEPHONE ENCOUNTER
Caller: Harman Lui    Relationship: Self    Best call back number: [phone]    Who are you requesting to speak with (clinical staff, provider,  specific staff member):   KIMBERLEY PENA/JOCELYN    What was the call regarding:   LAB ORDERS TO [facility].    TOLD PT JOCELYN FAXED THOSE ORDERS LAST Friday, 2-17-23.    TOLD PT IF THEY DON'T HAVE THE ORDERS TO CALL BACK AND WE WILL FAX AGAIN.

## 2023-02-20 NOTE — TELEPHONE ENCOUNTER
Caller: Harman Lui     Relationship: Self     Best call back number: [phone]    PT CALLED THE [facility] AND THEY DON'T HAVE THE LAB ORDERS.    THEIR FAX NUMBER [phone].    PLEASE SEND FAX AGAIN TO THE NUMBER LISTED ABOVE.

## 2023-02-22 ENCOUNTER — TELEPHONE (OUTPATIENT)
Dept: NEUROLOGY | Facility: CLINIC | Age: 73
End: 2023-02-22
Payer: MEDICARE

## 2023-02-22 NOTE — TELEPHONE ENCOUNTER
Caller: ENDY       Jose Rafael call back number: [phone]    What orders are you requesting (i.e. lab or imaging): LABS     In what timeframe would the patient need to come in: ASAP TODAY IF YOU CAN     Where will you receive your lab/imaging services: [facility]    Additional notes: [facility] . FIRST TIME TALKED TO JOCELYN SHE SAID. THE ORDER HAVE STILL NOT BEEN SENT      PLEASE SEND ASAP       PHONE # [phone]

## 2023-02-27 DIAGNOSIS — G40.909 SEIZURE DISORDER: Primary | ICD-10-CM

## 2023-02-27 RX ORDER — LAMOTRIGINE 150 MG/1
150 TABLET ORAL 2 TIMES DAILY
Qty: 180 TABLET | Refills: 3 | Status: SHIPPED | OUTPATIENT
Start: 2023-02-27

## 2023-02-28 ENCOUNTER — TELEPHONE (OUTPATIENT)
Dept: NEUROLOGY | Facility: CLINIC | Age: 73
End: 2023-02-28
Payer: MEDICARE

## 2023-02-28 NOTE — TELEPHONE ENCOUNTER
----- Message from Joseph F Seipel, MD sent at 2/27/2023  9:11 AM EST -----  The lamictal level is 3.5   Expected range4-18  The usually maintance dose is 300mg she is currently on 100mg bid  Increase dose to one 100mg tab in am and 1.5  100mg tab in pm for two weeks then increase  to 1.5tab bid  I will send in order for 150mg tab  After taking 150mg bid for two weeks in about one month , repeatt the lamictal blood level

## 2023-03-24 ENCOUNTER — TELEPHONE (OUTPATIENT)
Dept: NEUROLOGY | Facility: CLINIC | Age: 73
End: 2023-03-24
Payer: MEDICARE
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
## 2023-03-27 ENCOUNTER — TELEPHONE (OUTPATIENT)
Dept: NEUROLOGY | Facility: CLINIC | Age: 73
End: 2023-03-27
Payer: MEDICARE

## 2023-03-27 NOTE — TELEPHONE ENCOUNTER
Before doing lab   Confirm what dose of lamictal she has been taking a regular basis  See prior phone note

## 2023-03-27 NOTE — TELEPHONE ENCOUNTER
"Is she taking Lamictal 100 in am and 150 in pm or if taking 150mg bid  and still having symptoms go back to 100mg am and 150mg pm  I haven't \"taken her lic away. But she has been told to not drive due to probable seizures"

## 2023-03-27 NOTE — TELEPHONE ENCOUNTER
Caller: ENDY Mantilla call back number: [phone]    What orders are you requesting (i.e. lab or imaging): LABS     In what timeframe would the patient need to come in: ASAP     Where will you receive your lab/imaging services: [facility]    Additional notes: PT WANT LABS DONE THERE PLEASE SEND ORDER THERE     PLEASE ADVISE

## 2023-03-28 DIAGNOSIS — G40.909 SEIZURE DISORDER: Primary | ICD-10-CM

## 2023-03-28 NOTE — TELEPHONE ENCOUNTER
I put in an order for lamictal level to be done in am prior to am dose of lamictal, after taking the dose of 150mg bid for two weeks or more

## 2023-04-06 ENCOUNTER — TELEPHONE (OUTPATIENT)
Dept: NEUROLOGY | Facility: CLINIC | Age: 73
End: 2023-04-06
Payer: MEDICARE

## 2023-04-06 DIAGNOSIS — G40.909 SEIZURE DISORDER: ICD-10-CM

## 2023-04-06 NOTE — TELEPHONE ENCOUNTER
----- Message from Joseph F Seipel, MD sent at 4/3/2023 10:04 AM EDT -----  Lamotrigine level is in the therapeutic range, continue taking 150mg bid
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
## 2023-04-16 RX ORDER — LAMOTRIGINE 100 MG/1
150 TABLET ORAL 2 TIMES DAILY
Qty: 60 TABLET | Refills: 11 | Status: SHIPPED | OUTPATIENT
Start: 2023-04-16

## 2023-04-16 NOTE — TELEPHONE ENCOUNTER
Decrease the dose to 100mg in am  And  150mg at night for one week then go back to 100mg bid      I will put in an order for a drug level to be done in one month    If she is compliant with the 100mg bid and has nor further spells, I will fill out the driving form,  But she will have to keep taking at least the 100mg bid dose

## 2023-04-18 ENCOUNTER — TELEPHONE (OUTPATIENT)
Dept: NEUROLOGY | Facility: CLINIC | Age: 73
End: 2023-04-18

## 2023-04-18 NOTE — TELEPHONE ENCOUNTER
Caller: ENDY  Relationship to Patient: SELF  Phone Number: [phone]    Reason for Call: PT WANTED TO KNOW IF HER LABS WAS EVER FAXED OVER FROM [facility], I ADVISED PER MEDIA I ONLY SEE 3-29-23 LABS. PLEASE ADVISED IF RECEIVED VIA FAX AND NOT INDEXED INTO MEDIA YET. THANK YOU.

## 2023-05-02 DIAGNOSIS — G40.909 SEIZURE DISORDER: ICD-10-CM

## 2023-05-02 RX ORDER — LAMOTRIGINE 25 MG/1
TABLET ORAL
Qty: 60 TABLET | Refills: 3 | OUTPATIENT
Start: 2023-05-02

## 2023-05-23 ENCOUNTER — TELEPHONE (OUTPATIENT)
Dept: NEUROLOGY | Facility: CLINIC | Age: 73
End: 2023-05-23
Payer: MEDICARE

## 2023-05-23 NOTE — TELEPHONE ENCOUNTER
Caller: ENDY  Relationship to Patient: SELF  Phone Number: [phone]    Reason for Call: PT STATES SHE IS CURRENTLY AT [facility] TO HAVE THE FOLLOWING COMPLETED AND THEY ADVISED HER THAT THERE IS NO ORDER ON FILE. PLEASE REVIEW AND ADVISE.     Lamotrigine Level [HFX662] (Order [id number])    PLEASE FAX ASAP [phone]    SENDING AS URGENT (PER PT REQUEST) AS PT IS CURRENTLY AT THE HOSPITAL TO GET LABS COMPLETED NOW
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
## 2023-06-13 ENCOUNTER — OFFICE VISIT (OUTPATIENT)
Dept: NEUROLOGY | Facility: CLINIC | Age: 73
End: 2023-06-13
Payer: MEDICARE

## 2023-06-13 VITALS
DIASTOLIC BLOOD PRESSURE: 90 MMHG | HEIGHT: 65 IN | WEIGHT: 122 LBS | SYSTOLIC BLOOD PRESSURE: 160 MMHG | BODY MASS INDEX: 20.33 KG/M2 | HEART RATE: 72 BPM

## 2023-06-13 DIAGNOSIS — G40.909 SEIZURE DISORDER: Primary | ICD-10-CM

## 2023-06-13 PROCEDURE — 99214 OFFICE O/P EST MOD 30 MIN: CPT | Performed by: PSYCHIATRY & NEUROLOGY

## 2023-06-13 PROCEDURE — 1160F RVW MEDS BY RX/DR IN RCRD: CPT | Performed by: PSYCHIATRY & NEUROLOGY

## 2023-06-13 PROCEDURE — 1159F MED LIST DOCD IN RCRD: CPT | Performed by: PSYCHIATRY & NEUROLOGY

## 2023-06-13 PROCEDURE — 3080F DIAST BP >= 90 MM HG: CPT | Performed by: PSYCHIATRY & NEUROLOGY

## 2023-06-13 PROCEDURE — 3077F SYST BP >= 140 MM HG: CPT | Performed by: PSYCHIATRY & NEUROLOGY

## 2023-06-13 RX ORDER — LAMOTRIGINE 200 MG/1
200 TABLET, EXTENDED RELEASE ORAL DAILY
Qty: 30 TABLET | Refills: 11 | Status: SHIPPED | OUTPATIENT
Start: 2023-06-13

## 2023-06-13 RX ORDER — LAMOTRIGINE 250 MG/1
250 TABLET, EXTENDED RELEASE ORAL NIGHTLY
Qty: 30 TABLET | Refills: 11 | Status: SHIPPED | OUTPATIENT
Start: 2023-06-13

## 2023-06-13 RX ORDER — MECLIZINE HYDROCHLORIDE 25 MG/1
1 TABLET ORAL 3 TIMES DAILY
COMMUNITY
Start: 2023-05-23 | End: 2023-06-13

## 2023-06-13 RX ORDER — OMEPRAZOLE 20 MG/1
1 CAPSULE, DELAYED RELEASE ORAL DAILY
COMMUNITY
Start: 2023-05-23 | End: 2023-06-13

## 2023-06-13 RX ORDER — LAMOTRIGINE 150 MG/1
TABLET ORAL
COMMUNITY
Start: 2023-02-09 | End: 2023-06-13

## 2023-06-13 RX ORDER — BUSPIRONE HYDROCHLORIDE 10 MG/1
15 TABLET ORAL
COMMUNITY
Start: 2023-02-01 | End: 2023-06-13

## 2023-06-22 ENCOUNTER — TELEPHONE (OUTPATIENT)
Dept: NEUROLOGY | Facility: CLINIC | Age: 73
End: 2023-06-22

## 2023-06-22 NOTE — TELEPHONE ENCOUNTER
Patient LM with answering service that she wants to go off Lamictal due to side effects are too bad.

## 2023-07-05 NOTE — TELEPHONE ENCOUNTER
I have taken Lamictal off of the medication list.   However the form for the drivers license states ok to drive because taking medication  Given not tolerating any of the multiple medications tried not sure what to try next if any...

## 2023-11-09 DIAGNOSIS — G40.909 SEIZURE DISORDER: ICD-10-CM

## 2023-11-10 RX ORDER — LAMOTRIGINE 100 MG/1
50 TABLET ORAL DAILY
Qty: 90 TABLET | Refills: 3 | Status: SHIPPED | OUTPATIENT
Start: 2023-11-10

## 2024-01-01 DIAGNOSIS — G40.909 SEIZURE DISORDER: ICD-10-CM

## 2024-01-03 RX ORDER — LAMOTRIGINE 100 MG/1
50 TABLET ORAL DAILY
Qty: 90 TABLET | Refills: 3 | Status: SHIPPED | OUTPATIENT
Start: 2024-01-03

## 2024-01-08 ENCOUNTER — TELEPHONE (OUTPATIENT)
Dept: NEUROLOGY | Facility: CLINIC | Age: 74
End: 2024-01-08

## 2024-01-08 DIAGNOSIS — G40.909 SEIZURE DISORDER: ICD-10-CM
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
## 2024-01-08 NOTE — TELEPHONE ENCOUNTER
Caller: Hu Nayanmarleny WRIGHT    Relationship: Self    Best call back number: [phone]    Requested Prescriptions:   Requested Prescriptions     Pending Prescriptions Disp Refills    lamoTRIgine (LaMICtal) 100 MG tablet 90 tablet 3     Sig: Take 0.5 tablets by mouth Daily. If tolerated, increase to twice per day        Pharmacy where request should be sent: [facility] - [facility] [address] - [phone]  - [phone] FX     Last office visit with prescribing clinician: 6/13/2023   Last telemedicine visit with prescribing clinician: Visit date not found   Next office visit with prescribing clinician: 9/5/2024     Additional details provided by patient:   PT HAD A SEIZURE TODAY. SEE ENCOUNTER    Does the patient have less than a 3 day supply:  [x] Yes  [] No    Would you like a call back once the refill request has been completed: [] Yes [] No    If the office needs to give you a call back, can they leave a voicemail: [] Yes [] No    Jyoti Meredith Rep   01/08/24 14:46 EST

## 2024-01-09 RX ORDER — LAMOTRIGINE 100 MG/1
50 TABLET ORAL DAILY
Qty: 90 TABLET | Refills: 3 | Status: SHIPPED | OUTPATIENT
Start: 2024-01-09
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
## 2024-01-25 DIAGNOSIS — G40.909 SEIZURE DISORDER: ICD-10-CM

## 2024-01-26 ENCOUNTER — TELEPHONE (OUTPATIENT)
Dept: NEUROLOGY | Facility: CLINIC | Age: 74
End: 2024-01-26

## 2024-01-26 RX ORDER — LAMOTRIGINE 100 MG/1
50 TABLET ORAL DAILY
Qty: 90 TABLET | Refills: 1 | Status: SHIPPED | OUTPATIENT
Start: 2024-01-26

## 2024-01-26 NOTE — TELEPHONE ENCOUNTER
Caller: Harman Lui    Relationship to patient: Self    Best call back number: [phone]    Chief complaint: SEIZURES    Type of visit: FOLLOW UP    Requested date: ASAP     If rescheduling, when is the original appointment: 3/13/24     Additional notes: PATIENT WOULD LIKE TO SEE IF SHE CAN GET IN ANY SOONER, REQUESTED TO SPEAK WITH JOCELYN IF POSSIBLE.

## 2024-01-26 NOTE — TELEPHONE ENCOUNTER
Caller: Harman Lui    Relationship: Self    Best call back number: [phone]    Who are you requesting to speak with (clinical staff, provider,  specific staff member): STAFF    Do you know the name of the person who called: JOCELYN    What was the call regarding: DISREGARD PREVIOUS MESSAGE AND KEEP HER MARCH APPT WHERE IT IS PLEASE.    Is it okay if the provider responds through MyChart: YES

## 2024-01-31 DIAGNOSIS — G40.909 SEIZURE DISORDER: ICD-10-CM

## 2024-01-31 RX ORDER — LAMOTRIGINE 100 MG/1
50 TABLET ORAL DAILY
Qty: 90 TABLET | Refills: 1 | Status: CANCELLED | OUTPATIENT
Start: 2024-01-31

## 2024-01-31 NOTE — TELEPHONE ENCOUNTER
See mychart message below and also other [employer] message sent from patient. Dr. Seipel is aware of both of these issues and or questions and will address at her March visit.
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
## 2024-02-02 DIAGNOSIS — G40.909 SEIZURE DISORDER: ICD-10-CM

## 2024-02-05 RX ORDER — LAMOTRIGINE 100 MG/1
50 TABLET ORAL DAILY
Qty: 90 TABLET | Refills: 1 | Status: SHIPPED | OUTPATIENT
Start: 2024-02-05
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
## 2024-03-13 ENCOUNTER — OFFICE VISIT (OUTPATIENT)
Dept: NEUROLOGY | Facility: CLINIC | Age: 74
End: 2024-03-13
Payer: MEDICARE

## 2024-03-13 VITALS
WEIGHT: 121 LBS | BODY MASS INDEX: 20.16 KG/M2 | HEART RATE: 68 BPM | HEIGHT: 65 IN | DIASTOLIC BLOOD PRESSURE: 79 MMHG | SYSTOLIC BLOOD PRESSURE: 145 MMHG

## 2024-03-13 DIAGNOSIS — G40.909 SEIZURE DISORDER: Primary | ICD-10-CM

## 2024-03-13 PROCEDURE — 99214 OFFICE O/P EST MOD 30 MIN: CPT | Performed by: PSYCHIATRY & NEUROLOGY

## 2024-03-13 PROCEDURE — 1159F MED LIST DOCD IN RCRD: CPT | Performed by: PSYCHIATRY & NEUROLOGY

## 2024-03-13 PROCEDURE — 3077F SYST BP >= 140 MM HG: CPT | Performed by: PSYCHIATRY & NEUROLOGY

## 2024-03-13 PROCEDURE — 3078F DIAST BP <80 MM HG: CPT | Performed by: PSYCHIATRY & NEUROLOGY

## 2024-03-13 PROCEDURE — 1160F RVW MEDS BY RX/DR IN RCRD: CPT | Performed by: PSYCHIATRY & NEUROLOGY

## 2024-03-13 RX ORDER — LAMOTRIGINE 100 MG/1
TABLET ORAL
Qty: 180 TABLET | Refills: 3 | Status: SHIPPED | OUTPATIENT
Start: 2024-03-13

## 2024-03-13 RX ORDER — LAMOTRIGINE 100 MG/1
50 TABLET ORAL DAILY
Qty: 90 TABLET | Refills: 3 | Status: SHIPPED | OUTPATIENT
Start: 2024-03-13 | End: 2024-03-13 | Stop reason: SDUPTHER

## 2024-04-04 ENCOUNTER — TELEPHONE (OUTPATIENT)
Dept: NEUROLOGY | Facility: CLINIC | Age: 74
End: 2024-04-04

## 2024-04-04 NOTE — TELEPHONE ENCOUNTER
If the rash has been present for two months is not getting much worse and is only on the face it is not likely due to the lamictal.  I suggest she get into her dermatologist and or pcp about the rash on the face

## 2024-04-04 NOTE — TELEPHONE ENCOUNTER
RED FLAG CALL    Caller: Harman Lui    Relationship: Self    Best call back number: [phone]    Which medication are you concerned about: LAMOTRIGINE    Who prescribed you this medication: DR SEIPEL    When did you start taking this medication: JAN OF 2024     What are your concerns: PATIENT STATED THAT SHE HAD A RASH START TO APPEAR ON FACE OVER HER NOSE. IT SHOWED UP APPROX 2 MONTHS AGO. SHE STATED THAT SHE HAS BEEN SOARES SKIN CANCER BUT WAS UNSURE DUE TO THE TIMING OF STARTING THE MEDICATION IF THIS IS A REACTION TO THE MEDS OR THE CANCER.     How long have you had these concerns: 2 MONTHS    TRIED TO WARM TRANSFER TO OFFICE BUT ADVISED TO SEND AN ENCOUNTER.     PLEASE REVIEW  THANK YOU

## 2024-04-30 DIAGNOSIS — G40.909 SEIZURE DISORDER: Primary | ICD-10-CM

## 2024-06-07 ENCOUNTER — TELEPHONE (OUTPATIENT)
Dept: NEUROLOGY | Facility: CLINIC | Age: 74
End: 2024-06-07
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
## 2024-06-10 ENCOUNTER — TELEPHONE (OUTPATIENT)
Dept: NEUROLOGY | Facility: CLINIC | Age: 74
End: 2024-06-10
Payer: MEDICARE

## 2024-06-10 ENCOUNTER — TELEPHONE (OUTPATIENT)
Dept: NEUROLOGY | Facility: CLINIC | Age: 74
End: 2024-06-10

## 2024-06-10 NOTE — TELEPHONE ENCOUNTER
Caller: HuHarman    Relationship: Self    Best call back number: [phone]    Which medication are you concerned about: LAMOTRIGINE     Who prescribed you this medication: SEIPEL, JOSEPH, MD    When did you start taking this medication:  JANUARY    What are your concerns: STATED SHE HAS DEVELOPED A RASH AND HAVING HOT SPELLS.  STATED THE RASH STARTED ON SATURDAY (6-8-24) AND IT IS EVERYWHERE.  STATED IT IS REALLY BAD ON HER NECK AND BACK.  STATED THE HOT SPELLS ALSO STARTED ON 6-8-24 AND IT HAPPENS ABOUT EVERY 2 OR 3 HOURS.      How long have you had these concerns:  SATURDAY (6-8-24)    STATED IF UNABLE TO REACH PATIENT TO PLEASE SEND A MY CHART MESSAGE.    PLEASE CALL & ADVISE

## 2024-06-10 NOTE — TELEPHONE ENCOUNTER
Provider: SEIPEL    Caller: ENDY    Phone Number: [phone]     Reason for Call: PT CALLED AND IS WANTING TO KNOW IF ORDERS FOR BLOOD WORK CAN BE SENT TO HER PCP OFFICE LOCATED AT [facility].    PLEASE REVIEW AND ADVISE  THANK YOU

## 2024-06-13 NOTE — TELEPHONE ENCOUNTER
Patient states she was under the impression that she goes up 50 mg q month on Lamictal she states she is currently takes 100 mg qam,50 mg in afternoon and 100 mg qhs I advised patient of instructions on rx.... patient labs scanned under media

## 2024-06-13 NOTE — TELEPHONE ENCOUNTER
Provider: SEIPEL, JOSEPH, MD    Caller: AURORAVENANCIO    Relationship to Patient: SELF    Phone Number: [phone]    Reason for Call: PT CALLING TO LET PROVIDER KNOW SHE HAD HER BLOOD WORK DONE YESTERDAY (6-12-24).  STATED SHE DOES NOT THINK THE RASH IS FROM THE LAMOTRIGINE.  STATED SHE IS GOING TO STAY ON THE MEDICATION AND ON 6-14-24 SHE WILL UP THE DOSAGE TO 50 MG MORE AND WILL BE AT  [facility].     When was the patient last seen: 3-13-24      PLEASE CALL & ADVISE

## 2024-06-13 NOTE — TELEPHONE ENCOUNTER
The rx was for:  Order Details: 1/2 tab in am and one tab in pm , for one month then take one tab bid Dispense: 180 tablet, Refills: 3 ordered  Admin Instructions: 1/2 tab in am and one tab in pm , for one month then take one tab bid    Confirm how much she is taking and where she had the blood drawn, maybe at [facility], and result if available

## 2024-06-21 DIAGNOSIS — G40.909 SEIZURE DISORDER: ICD-10-CM

## 2024-06-21 NOTE — TELEPHONE ENCOUNTER
The lamictal level was 4.6, low therapeutic, so it would be best to stay on the dose she was taking when the blood was drawn.  So if this was 100mg bid and 50mg at lunch for 250mg, stay on that dose

## 2025-05-30 ENCOUNTER — TELEPHONE (OUTPATIENT)
Dept: NEUROLOGY | Facility: CLINIC | Age: 75
End: 2025-05-30
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
